# Patient Record
Sex: MALE | Race: WHITE | Employment: FULL TIME | URBAN - METROPOLITAN AREA
[De-identification: names, ages, dates, MRNs, and addresses within clinical notes are randomized per-mention and may not be internally consistent; named-entity substitution may affect disease eponyms.]

---

## 2017-06-28 ENCOUNTER — HOSPITAL ENCOUNTER (OUTPATIENT)
Age: 22
Discharge: HOME | End: 2017-06-28
Attending: EMERGENCY MEDICINE
Payer: COMMERCIAL

## 2017-06-28 VITALS
OXYGEN SATURATION: 100 % | RESPIRATION RATE: 16 BRPM | WEIGHT: 170 LBS | DIASTOLIC BLOOD PRESSURE: 93 MMHG | BODY MASS INDEX: 23.03 KG/M2 | TEMPERATURE: 99 F | HEART RATE: 73 BPM | HEIGHT: 72 IN | SYSTOLIC BLOOD PRESSURE: 125 MMHG

## 2017-06-28 DIAGNOSIS — S81.812A LACERATION OF LEG, LEFT, INITIAL ENCOUNTER: Primary | ICD-10-CM

## 2017-06-28 PROCEDURE — 99202 OFFICE O/P NEW SF 15 MIN: CPT

## 2017-06-28 RX ORDER — GINSENG 100 MG
CAPSULE ORAL ONCE
Status: COMPLETED | OUTPATIENT
Start: 2017-06-28 | End: 2017-06-28

## 2017-06-28 NOTE — ED PROVIDER NOTES
Patient Seen in: 5 [facility]    History   Patient presents with:  Laceration Abrasion (integumentary)    Stated Complaint: Left leg injury    HPI  1 hour prior to arrival the patient states he was walking past the edge of Cardiovascular: Normal rate, regular rhythm and intact distal pulses.     Pulmonary/Chest: Effort normal.   Musculoskeletal:        Left knee: Normal.        Left ankle: Normal.        Legs:  Neurological: He is alert and oriented to person, place, and time

## 2017-09-01 PROCEDURE — 80307 DRUG TEST PRSMV CHEM ANLYZR: CPT | Performed by: INTERNAL MEDICINE
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
## 2019-01-21 ENCOUNTER — HOSPITAL ENCOUNTER (OUTPATIENT)
Age: 24
Discharge: HOME | End: 2019-01-21
Payer: COMMERCIAL

## 2019-01-21 VITALS
OXYGEN SATURATION: 100 % | HEART RATE: 120 BPM | SYSTOLIC BLOOD PRESSURE: 157 MMHG | TEMPERATURE: 98 F | DIASTOLIC BLOOD PRESSURE: 85 MMHG | RESPIRATION RATE: 22 BRPM

## 2019-01-21 DIAGNOSIS — R00.2 PALPITATIONS: Primary | ICD-10-CM

## 2019-01-21 PROCEDURE — 99214 OFFICE O/P EST MOD 30 MIN: CPT

## 2019-01-21 PROCEDURE — 93010 ELECTROCARDIOGRAM REPORT: CPT | Performed by: NURSE PRACTITIONER

## 2019-01-21 PROCEDURE — 93010 ELECTROCARDIOGRAM REPORT: CPT

## 2019-01-21 PROCEDURE — 93005 ELECTROCARDIOGRAM TRACING: CPT

## 2019-01-21 NOTE — ED PROVIDER NOTES
Patient presents with:  Arrythmia/Palpitations (cardiovascular)      HPI:     This 26year old male presents with a chief complaint of palpitations that have been intermittent for the past week.   The patient states he is currently being treated for social Female    Other Topics      Concerns:        Not on file    Social History Narrative      Not on file      ROS:   Positive for stated complaint: anxiety, palpitations, shortness of breath  All other systems reviewed and negative except as noted above.   Con

## 2019-01-21 NOTE — ED INITIAL ASSESSMENT (HPI)
PATIENT REPORTS INTERMITTENT PALPITATIONS FOR THE PAST WEEK. STATES THEY LAST 3-5 MINUTES AT A TIME. STATES HE FEELS LIKE HIS SYMPTOMS MAY BE RELATED TO ANXIETY BUT HE IS UNSURE. DENIES NAUSEA/VOMITING. REPORTS MILD DIZZINESS WITH THE PALPITATIONS.

## 2019-01-22 NOTE — ED PROVIDER NOTES
Patient Seen in: [facility] Emergency Department    History   Patient presents with:  Arrythmia/Palpitations (cardiovascular)    Stated Complaint: palpitations    HPI    The patient is a 21-year-old male who was sitting at a movie when he suddenly place, and time. He appears well-developed and well-nourished. No distress. HENT:   Head: Normocephalic and atraumatic.    Mouth/Throat: Oropharynx is clear and moist.   Eyes: Conjunctivae and EOM are normal. Pupils are equal, round, and reactive to light orders. RAINBOW DRAW BLUE   RAINBOW DRAW LAVENDER   RAINBOW DRAW DARK GREEN   RAINBOW DRAW LIGHT GREEN   RAINBOW DRAW GOLD   RAINBOW DRAW LAVENDER TALL (BNP)     EKG    Rate, intervals and axes as noted on EKG Report.   Rate: 91  Rhythm: Sinus Rhythm  Conchita Lorenz

## 2019-01-22 NOTE — ED INITIAL ASSESSMENT (HPI)
C/o feeling heart palpitations about 2 hours ago sitting in the theatre. Pt denies dizziness, chest pain or n/v, denies episodes of diaphoresis. Pt states this has been going on intermittently for the last few weeks. Denies medical hx.  Denies drugs or alco

## 2020-11-12 ENCOUNTER — TELEMEDICINE (OUTPATIENT)
Dept: TELEHEALTH | Age: 25
End: 2020-11-12
Payer: COMMERCIAL

## 2020-11-12 DIAGNOSIS — Z20.822 EXPOSURE TO COVID-19 VIRUS: Primary | ICD-10-CM

## 2020-11-12 PROCEDURE — 99213 OFFICE O/P EST LOW 20 MIN: CPT | Performed by: NURSE PRACTITIONER

## 2020-11-12 NOTE — PROGRESS NOTES
Mikael Blas is a 23year old male. HPI:   Patient presents with: Other: EXPOSURE TO COVID    Encounter was conducted by video visit. Patient states he had some friends over 5 days ago.  One of his friends developed symptoms, and patient just learne activity and work if your respiratory symptoms have improved and you are fever free for at least 24 hours  If symptoms worsen, please call your primary care physician's office. What to do if you are sick with coronavirus disease 2019 (COVID-19):   If y covering all surfaces of your hands and rubbing them together until they feel dry. Soap and water should be used preferentially if hands are visibly dirty.     Avoid sharing personal household items  You should not share dishes, drink glasses, cups, eating monitoring or facilitated self-monitoring should follow instructions provided by their local health department or occupational health professionals, as appropriate.   If you have a medical emergency and need to call 911, notify the dispatch personnel that y like counters, tabletops, and doorknobs. Use household cleaning sprays or wipes according to the label instructions.   Centers for Disease Control & Prevention (CDC)  What to do if you are sick with coronavirus disease 2019, [employer]

## 2020-11-13 ENCOUNTER — LAB ENCOUNTER (OUTPATIENT)
Dept: LAB | Age: 25
End: 2020-11-13
Attending: NURSE PRACTITIONER
Payer: COMMERCIAL

## 2020-11-13 DIAGNOSIS — Z20.822 EXPOSURE TO COVID-19 VIRUS: Primary | ICD-10-CM

## 2020-11-17 NOTE — PROGRESS NOTES
Call patient or mother to share the below    Your COVID testing is positive  You will need to home quarantine for 10 days  I do NOT advise gathering with family on Thanksgiving out of abundance of caution  No work or activities outside of the home  You nino

## 2021-05-09 ENCOUNTER — IMMUNIZATION (OUTPATIENT)
Dept: LAB | Facility: HOSPITAL | Age: 26
End: 2021-05-09
Attending: EMERGENCY MEDICINE
Payer: COMMERCIAL

## 2021-05-09 DIAGNOSIS — Z23 NEED FOR VACCINATION: Primary | ICD-10-CM

## 2021-05-09 PROCEDURE — 0001A SARSCOV2 VAC 30MCG/0.3ML IM: CPT

## (undated) NOTE — ED AVS SNAPSHOT
Mary Nieves   MRN: [id number]    Department:  [facility] Emergency Department   Date of Visit:  12/1/2017           Disclosure     Insurance plans vary and the physician(s) referred by the ER may not be covered by your plan.  Please contac CARE PHYSICIAN AT ONCE OR RETURN IMMEDIATELY TO THE EMERGENCY DEPARTMENT. If you have been prescribed any medication(s), please fill your prescription right away and begin taking the medication(s) as directed.   If you believe that any of the medications

## (undated) NOTE — ED AVS SNAPSHOT
Helena Luis   MRN: [id number]    Department:  [facility] Emergency Department   Date of Visit:  1/21/2019           Disclosure     Insurance plans vary and the physician(s) referred by the ER may not be covered by your plan.  Please contac CARE PHYSICIAN AT ONCE OR RETURN IMMEDIATELY TO THE EMERGENCY DEPARTMENT. If you have been prescribed any medication(s), please fill your prescription right away and begin taking the medication(s) as directed.   If you believe that any of the medications

## (undated) NOTE — LETTER
[facility] [place] [address]  [address] [facility] [address]  Dept: [phone]  Dept Fax: [phone]  Loc: [phone]      June 28, 2017    Patient: Jn Sparks   Date of Visit: 6/28/2017       To Whom It May Concern:    Juan Sheffield